# Patient Record
Sex: MALE | Race: WHITE | Employment: FULL TIME | URBAN - METROPOLITAN AREA
[De-identification: names, ages, dates, MRNs, and addresses within clinical notes are randomized per-mention and may not be internally consistent; named-entity substitution may affect disease eponyms.]

---

## 2018-05-29 ENCOUNTER — HOSPITAL ENCOUNTER (OUTPATIENT)
Dept: PHYSICAL THERAPY | Age: 38
Discharge: HOME | End: 2018-05-29
Payer: COMMERCIAL

## 2018-05-29 PROCEDURE — 97110 THERAPEUTIC EXERCISES: CPT

## 2018-05-29 PROCEDURE — 97161 PT EVAL LOW COMPLEX 20 MIN: CPT

## 2018-05-29 NOTE — PROGRESS NOTES
PT DAILY TREATMENT NOTE 12    Patient Name: Sreedhar Galvan  Date:2018  : 1980  [x]  Patient  Verified  Payor: Genna Hodges / Plan: [employer] / Product Type: HMO /    In time:900  Out time:950  Total Treatment Time (min): 50  Visit #: 1 of 12    Treatment Area: Cervicalgia [M54.2]  Radiculopathy, cervical region [M54.12]    SUBJECTIVE  Pain Level (0-10 scale): 3  Any medication changes, allergies to medications, adverse drug reactions, diagnosis change, or new procedure performed?: [x] No    [] Yes (see summary sheet for update)  Subjective functional status/changes:   [] No changes reported      OBJECTIVE    Modality rationale:    Min Type Additional Details    [] Estim:  []Unatt       []IFC  []Premod                        []Other:  []w/ice   []w/heat  Position:  Location:    [] Estim: []Att    []TENS instruct  []NMES                    []Other:  []w/US   []w/ice   []w/heat  Position:  Location:    []  Traction: [] Cervical       []Lumbar                       [] Prone          []Supine                       []Intermittent   []Continuous Lbs:  [] before manual  [] after manual    []  Ultrasound: []Continuous   [] Pulsed                           []1MHz   []3MHz W/cm2:  Location:    []  Iontophoresis with dexamethasone         Location: [] Take home patch   [] In clinic    []  Ice     []  heat  []  Ice massage  []  Laser   []  Anodyne Position:  Location:    []  Laser with stim  []  Other:  Position:  Location:    []  Vasopneumatic Device Pressure:       [] lo [] med [] hi   Temperature: [] lo [] med [] hi   [] Skin assessment post-treatment:  []intact []redness- no adverse reaction    []redness  adverse reaction:     42 min [x]Eval                  []Re-Eval       8 min Therapeutic Exercise:  [] See flow sheet : HEP   Rationale: increase ROM and increase strength to improve the patients ability to reduce radicular symptoms       With   [] TE   [] TA   [] neuro   [] other: Patient Education: [x] Review HEP    [] Progressed/Changed HEP based on:   [] positioning   [] body mechanics   [] transfers   [] heat/ice application    [] other:      Other Objective/Functional Measures:      Pain Level (0-10 scale) post treatment:3    ASSESSMENT/Changes in Function:     Patient will continue to benefit from skilled PT services to modify and progress therapeutic interventions, address functional mobility deficits, address ROM deficits, address strength deficits, analyze and address soft tissue restrictions, analyze and cue movement patterns, analyze and modify body mechanics/ergonomics, assess and modify postural abnormalities, address imbalance/dizziness and instruct in home and community integration to attain remaining goals.      [x]  See Plan of Care  []  See progress note/recertification  []  See Discharge Summary         Progress towards goals / Updated goals:  See POC    PLAN  []  Upgrade activities as tolerated     [x]  Continue plan of care  []  Update interventions per flow sheet       []  Discharge due to:_  []  Other:_      Michael Perez PT 5/29/2018  8:18 AM    Future Appointments  Date Time Provider Jennifer Guerra   5/29/2018 9:00 AM Michael Perez PT [facility]

## 2018-05-31 ENCOUNTER — HOSPITAL ENCOUNTER (OUTPATIENT)
Dept: PHYSICAL THERAPY | Age: 38
Discharge: HOME | End: 2018-05-31
Payer: COMMERCIAL

## 2018-05-31 PROCEDURE — 97014 ELECTRIC STIMULATION THERAPY: CPT

## 2018-05-31 PROCEDURE — 97112 NEUROMUSCULAR REEDUCATION: CPT

## 2018-05-31 NOTE — PROGRESS NOTES
PT DAILY TREATMENT NOTE 12    Patient Name: Vamshi Wright  Date:2018  : 1980  [x]  Patient  Verified  Payor: Gómez [employer] / Plan: [employer] / Product Type: HMO /    In time:840  Out time:931  Total Treatment Time (min): 46  Visit #: 2 of 12    Treatment Area: Cervicalgia [M54.2]  Radiculopathy, cervical region [M54.12]    SUBJECTIVE  Pain Level (0-10 scale): 3  Any medication changes, allergies to medications, adverse drug reactions, diagnosis change, or new procedure performed?: [x] No    [] Yes (see summary sheet for update)  Subjective functional status/changes:   [] No changes reported  It was super sore yesterday.      OBJECTIVE    Modality rationale: decrease inflammation and decrease pain to improve the patients ability to improve activity tolerance   Min Type Additional Details   10 [x] Estim:  [x]Unatt       []IFC  [x]Premod                        []Other:  [x]w/ice   []w/heat  Position:prone  Location:neck    [] Estim: []Att    []TENS instruct  []NMES                    []Other:  []w/US   []w/ice   []w/heat  Position:  Location:    []  Traction: [] Cervical       []Lumbar                       [] Prone          []Supine                       []Intermittent   []Continuous Lbs:  [] before manual  [] after manual    []  Ultrasound: []Continuous   [] Pulsed                           []1MHz   []3MHz W/cm2:  Location:    []  Iontophoresis with dexamethasone         Location: [] Take home patch   [] In clinic    []  Ice     []  heat  []  Ice massage  []  Laser   []  Anodyne Position:  Location:    []  Laser with stim  []  Other:  Position:  Location:    []  Vasopneumatic Device Pressure:       [] lo [] med [] hi   Temperature: [] lo [] med [] hi   [] Skin assessment post-treatment:  []intact []redness- no adverse reaction    []redness  adverse reaction:     41 min Neuromuscular Re-education:  []  See flow sheet :   Rationale: increase strength and improve coordination  to improve the patients ability to improve cervical/scapular stability    With   [] TE   [] TA   [] neuro   [] other: Patient Education: [x] Review HEP    [] Progressed/Changed HEP based on:   [] positioning   [] body mechanics   [] transfers   [] heat/ice application    [] other:      Other Objective/Functional Measures: initiated treatment per flow sheet     Pain Level (0-10 scale) post treatment: 1-2    ASSESSMENT/Changes in Function: No increase, or decrease, in Left UE numbness during first follow up. Patient reports slight decrease in severity of numbness in the Left thumb following session. Patient will continue to benefit from skilled PT services to modify and progress therapeutic interventions, address functional mobility deficits, address ROM deficits, address strength deficits, analyze and address soft tissue restrictions, analyze and cue movement patterns, analyze and modify body mechanics/ergonomics, assess and modify postural abnormalities, address imbalance/dizziness and instruct in home and community integration to attain remaining goals. []  See Plan of Care  []  See progress note/recertification  []  See Discharge Summary         Progress towards goals / Updated goals:  Short Term Goals: To be accomplished in 1 weeks:  Goal: Patient will be independent and compliant with HEP in order to progress toward long term goals. Status at last note/certification: Issued and reviewed  Current status: Progressing, initiated   Long Term Goals: To be accomplished in 12 treatments:  Goal: Patient will improve FOTO assessment score to 76 in order to indicate improved functional abilities. Status at last note/certification: 55  Current status:   Goal: Patient will report no increase in pain or numbness with full active cervical extension in order to indicate centralization of symptoms and improve ease of daily tasks.   Status at last note/certification: Increasing left thumb numbness, posterior shoulder/scapular pain  Current status:   Goal: Patient will report at least 50% reduction in radicular symptoms in order to more easily perform daily tasks with the Left UE. Status at last note/certification: n/a  Current status:   Goal: Patient will improve Left middle trapezius strength to 5/5 without pain in order to improve overall stability with recreational activities.   Status at last note/certification: 4/5 with pain  Current status:     PLAN  []  Upgrade activities as tolerated     [x]  Continue plan of care  []  Update interventions per flow sheet       []  Discharge due to:_  []  Other:_      Justyn Peterson PT 5/31/2018  8:58 AM    Future Appointments  Date Time Provider Jennifer Guerra   6/4/2018 8:30  [address] [facility]   6/5/2018 8:30 AM uJstyn Peterson PT [facility]   6/7/2018 8:30 AM Judith Buckner [facility]   6/11/2018 8:30  [address] [facility]   6/12/2018 8:30  [address] [facility]   6/14/2018 8:30  [address] [facility]

## 2018-06-04 ENCOUNTER — HOSPITAL ENCOUNTER (OUTPATIENT)
Dept: PHYSICAL THERAPY | Age: 38
Discharge: HOME | End: 2018-06-04
Payer: COMMERCIAL

## 2018-06-04 PROCEDURE — 97112 NEUROMUSCULAR REEDUCATION: CPT

## 2018-06-04 PROCEDURE — 97014 ELECTRIC STIMULATION THERAPY: CPT

## 2018-06-04 NOTE — PROGRESS NOTES
PT DAILY TREATMENT NOTE 12-    Patient Name: Andre Brantley  Date:2018  : 1980  [x]  Patient  Verified  Payor: Desiree Hannon / Plan: [employer] / Product Type: HMO /    In time: 8:25  Out time: 9:02  Total Treatment Time (min): 37  Visit #: 3 of 12    Treatment Area: Cervicalgia [M54.2]  Radiculopathy, cervical region [M54.12]    SUBJECTIVE  Pain Level (0-10 scale): 1-2/10  Any medication changes, allergies to medications, adverse drug reactions, diagnosis change, or new procedure performed?: [x] No    [] Yes (see summary sheet for update)  Subjective functional status/changes:   [] No changes reported  \"I don't know if the pain is getting better or if the medication is awesome but we are doing something. I only have numbness in the left thumb now. The other fingers aren't numb anymore. \"     OBJECTIVE    Modality rationale: decrease inflammation and decrease pain to improve the patients ability to improve activity tolerance   Min Type Additional Details   10 [x] Estim:  [x]Unatt       []IFC  [x]Premod                        []Other:  [x]w/ice   []w/heat  Position:prone  Location:neck    [] Estim: []Att    []TENS instruct  []NMES                    []Other:  []w/US   []w/ice   []w/heat  Position:  Location:    []  Traction: [] Cervical       []Lumbar                       [] Prone          []Supine                       []Intermittent   []Continuous Lbs:  [] before manual  [] after manual    []  Ultrasound: []Continuous   [] Pulsed                           []1MHz   []3MHz W/cm2:  Location:    []  Iontophoresis with dexamethasone         Location: [] Take home patch   [] In clinic    []  Ice     []  heat  []  Ice massage  []  Laser   []  Anodyne Position:  Location:    []  Laser with stim  []  Other:  Position:  Location:    []  Vasopneumatic Device Pressure:       [] lo [] med [] hi   Temperature: [] lo [] med [] hi   [] Skin assessment post-treatment:  []intact []redness- no adverse reaction    []redness  adverse reaction:     27 min Neuromuscular Re-education:  []  See flow sheet :   Rationale: increase strength and improve coordination  to improve the patients ability to improve cervical/scapular stability    With   [] TE   [] TA   [] neuro   [] other: Patient Education: [x] Review HEP    [] Progressed/Changed HEP based on:   [] positioning   [] body mechanics   [] transfers   [] heat/ice application    [] other:      Other Objective/Functional Measures: Continued with Rx program per flow sheet. Pain Level (0-10 scale) post treatment: 0/10    ASSESSMENT/Changes in Function: Pt notes no numbness in fingers except left thumb. Pt reported no clicking or cramping in neck and shoulder blade region. Left thumb numbness did not change. Patient will continue to benefit from skilled PT services to address functional mobility deficits, address ROM deficits, address strength deficits, analyze and address soft tissue restrictions, analyze and cue movement patterns, analyze and modify body mechanics/ergonomics, assess and modify postural abnormalities and instruct in home and community integration to attain remaining goals. []  See Plan of Care  []  See progress note/recertification  []  See Discharge Summary         Progress towards goals / Updated goals:  Short Term Goals: To be accomplished in 1 weeks:  Goal: Patient will be independent and compliant with HEP in order to progress toward long term goals. Status at last note/certification: Issued and reviewed  Current status: met - Pt reports compliance  Long Term Goals: To be accomplished in 12 treatments:  Goal: Patient will improve FOTO assessment score to 76 in order to indicate improved functional abilities.   Status at last note/certification: 55  Current status: reassess visit 5  Goal: Patient will report no increase in pain or numbness with full active cervical extension in order to indicate centralization of symptoms and improve ease of daily tasks. Status at last note/certification: Increasing left thumb numbness, posterior shoulder/scapular pain  Current status:   Goal: Patient will report at least 50% reduction in radicular symptoms in order to more easily perform daily tasks with the Left UE. Status at last note/certification: n/a  Current status:   Goal: Patient will improve Left middle trapezius strength to 5/5 without pain in order to improve overall stability with recreational activities.   Status at last note/certification: 4/5 with pain  Current status:     PLAN  []  Upgrade activities as tolerated     [x]  Continue plan of care  []  Update interventions per flow sheet       []  Discharge due to:_  [x]  Other:_goals next visit      Risa Tobar, PT 6/4/2018  8:58 AM    Future Appointments  Date Time Provider Jennifer Guerra   6/5/2018 8:30  [address] [facility]   6/7/2018 8:30  [address] [facility]   6/11/2018 8:30  [address] [facility]   6/12/2018 8:30  [address] [facility]   6/14/2018 8:30  [address] [facility]

## 2018-06-05 ENCOUNTER — HOSPITAL ENCOUNTER (OUTPATIENT)
Dept: PHYSICAL THERAPY | Age: 38
Discharge: HOME | End: 2018-06-05
Payer: COMMERCIAL

## 2018-06-05 PROCEDURE — 97014 ELECTRIC STIMULATION THERAPY: CPT

## 2018-06-05 PROCEDURE — 97112 NEUROMUSCULAR REEDUCATION: CPT

## 2018-06-05 NOTE — PROGRESS NOTES
PT DAILY TREATMENT NOTE 12-    Patient Name: Luis Alberto Jasso  Date:2018  : 1980  [x]  Patient  Verified  Payor: Erwin Bar / Plan: [employer] / Product Type: HMO /    In time:8:30  Out time:9;15  Total Treatment Time (min): 45  Visit #: 4 of 12    Treatment Area: Cervicalgia [M54.2]  Radiculopathy, cervical region [M54.12]    SUBJECTIVE  Pain Level (0-10 scale): 2  Any medication changes, allergies to medications, adverse drug reactions, diagnosis change, or new procedure performed?: [x] No    [] Yes (see summary sheet for update)  Subjective functional status/changes:   [] No changes reported  I feel good after PT, but it only lasts until that evening.   Overall my neck isn't bothering me, just this throbbing, pain in my shoulder    OBJECTIVE    Modality rationale: decrease pain and increase tissue extensibility to improve the patients ability to improve ease of daily tasks   Min Type Additional Details   15 [x] Estim:  [x]Unatt       []IFC  [x]Premod                        []Other:  [x]w/ice   []w/heat  Position:prone  Location: neck, left shoulder    [] Estim: []Att    []TENS instruct  []NMES                    []Other:  []w/US   []w/ice   []w/heat  Position:  Location:    []  Traction: [] Cervical       []Lumbar                       [] Prone          []Supine                       []Intermittent   []Continuous Lbs:  [] before manual  [] after manual    []  Ultrasound: []Continuous   [] Pulsed                           []1MHz   []3MHz W/cm2:  Location:    []  Iontophoresis with dexamethasone         Location: [] Take home patch   [] In clinic    []  Ice     []  heat  []  Ice massage  []  Laser   []  Anodyne Position:  Location:    []  Laser with stim  []  Other:  Position:  Location:    []  Vasopneumatic Device Pressure:       [] lo [] med [] hi   Temperature: [] lo [] med [] hi   [x] Skin assessment post-treatment:  [x]intact []redness- no adverse reaction    []redness  adverse reaction:          30 min Neuromuscular Re-education:  []  See flow sheet :   Rationale: increase strength and improve coordination  to improve the patients ability to increase shoulder stability, posture          With   [] TE   [] TA   [] neuro   [] other: Patient Education: [x] Review HEP    [] Progressed/Changed HEP based on:   [] positioning   [] body mechanics   [] transfers   [] heat/ice application    [] other:      Other Objective/Functional Measures:      Pain Level (0-10 scale) post treatment: 0    ASSESSMENT/Changes in Function: Palpable tenderness and taut left thorcic PS. Neck symptoms nearly resolved. Patient will continue to benefit from skilled PT services to modify and progress therapeutic interventions, address functional mobility deficits, address ROM deficits, address strength deficits, analyze and address soft tissue restrictions, analyze and cue movement patterns, analyze and modify body mechanics/ergonomics, assess and modify postural abnormalities, address imbalance/dizziness and instruct in home and community integration to attain remaining goals. []  See Plan of Care  []  See progress note/recertification  []  See Discharge Summary         Progress towards goals / Updated goals:  Goal: Patient will be independent and compliant with HEP in order to progress toward long term goals. Status at last note/certification: Issued and reviewed  Current status: met - Pt reports compliance  Long Term Goals: To be accomplished in 12 treatments:  Goal: Patient will improve FOTO assessment score to 76 in order to indicate improved functional abilities. Status at last note/certification: 55  Current status: reassess visit 5  Goal: Patient will report no increase in pain or numbness with full active cervical extension in order to indicate centralization of symptoms and improve ease of daily tasks.   Status at last note/certification: Increasing left thumb numbness, posterior shoulder/scapular pain  Current status:   Goal: Patient will report at least 50% reduction in radicular symptoms in order to more easily perform daily tasks with the Left UE. Status at last note/certification: n/a  Current status:   Goal: Patient will improve Left middle trapezius strength to 5/5 without pain in order to improve overall stability with recreational activities.   Status at last note/certification: 4/5 with pain  Current status:     PLAN  [x]  Upgrade activities as tolerated     []  Continue plan of care  []  Update interventions per flow sheet       []  Discharge due to:_  []  Other:_      Floy Goodpasture, PTA 6/5/2018  10:02 AM    Future Appointments  Date Time Provider Jennifer Guerra   6/7/2018 8:30  [address] [facility]   6/11/2018 8:30 AM Daril Anes [facility]   6/12/2018 8:30  [address] [facility]   6/14/2018 8:30  [address] [facility]

## 2018-06-07 ENCOUNTER — HOSPITAL ENCOUNTER (OUTPATIENT)
Dept: PHYSICAL THERAPY | Age: 38
Discharge: HOME | End: 2018-06-07
Payer: COMMERCIAL

## 2018-06-07 PROCEDURE — 97112 NEUROMUSCULAR REEDUCATION: CPT

## 2018-06-07 PROCEDURE — 97014 ELECTRIC STIMULATION THERAPY: CPT

## 2018-06-07 NOTE — PROGRESS NOTES
PT DAILY TREATMENT NOTE 12-16    Patient Name: Jake Downs  Date:2018  : 1980  [x]  Patient  Verified  Payor: Umu Gonsales / Plan: [employer] / Product Type: HMO /    In time:8:30  Out time:9:30  Total Treatment Time (min): 60  Visit #: 5 of 12    Treatment Area: Cervicalgia [M54.2]  Radiculopathy, cervical region [M54.12]    SUBJECTIVE  Pain Level (0-10 scale): 1  Any medication changes, allergies to medications, adverse drug reactions, diagnosis change, or new procedure performed?: [x] No    [] Yes (see summary sheet for update)  Subjective functional status/changes:   [] No changes reported  The pain is manageable now.   But my thumb stays numb     OBJECTIVE    Modality rationale: decrease pain to improve the patients ability to improve ease of functiohn   Min Type Additional Details   15 [x] Estim:  [x]Unatt       []IFC  [x]Premod                        []Other:  [x]w/ice   []w/heat  Position: supine  Location: left scap, left shoulder    [] Estim: []Att    []TENS instruct  []NMES                    []Other:  []w/US   []w/ice   []w/heat  Position:  Location:    []  Traction: [] Cervical       []Lumbar                       [] Prone          []Supine                       []Intermittent   []Continuous Lbs:  [] before manual  [] after manual    []  Ultrasound: []Continuous   [] Pulsed                           []1MHz   []3MHz W/cm2:  Location:    []  Iontophoresis with dexamethasone         Location: [] Take home patch   [] In clinic    []  Ice     []  heat  []  Ice massage  []  Laser   []  Anodyne Position:  Location:    []  Laser with stim  []  Other:  Position:  Location:    []  Vasopneumatic Device Pressure:       [] lo [] med [] hi   Temperature: [] lo [] med [] hi   [x] Skin assessment post-treatment:  []intact []redness- no adverse reaction    []redness  adverse reaction:        45 min Neuromuscular Re-education:  []  See flow sheet :   Rationale: increase strength and improve coordination  to improve the patients ability to improve posture, ease of job tasks, ADL's          With   [] TE   [] TA   [] neuro   [] other: Patient Education: [x] Review HEP    [] Progressed/Changed HEP based on:   [] positioning   [] body mechanics   [] transfers   [] heat/ice application    [] other:      Other Objective/Functional Measures:      Pain Level (0-10 scale) post treatment:  0    ASSESSMENT/Changes in Function: overall pain is manageable, however still unable ankit do warm up with kids for football. Pain with standing arm circles at about 90 abd. Left thumb remains numb while index finger has regained sensation    Patient will continue to benefit from skilled PT services to modify and progress therapeutic interventions, address functional mobility deficits, address ROM deficits, address strength deficits, analyze and address soft tissue restrictions, analyze and cue movement patterns, analyze and modify body mechanics/ergonomics, assess and modify postural abnormalities, address imbalance/dizziness and instruct in home and community integration to attain remaining goals. []  See Plan of Care  []  See progress note/recertification  []  See Discharge Summary         Progress towards goals / Updated goals:  Goal: Patient will be independent and compliant with HEP in order to progress toward long term goals. Status at last note/certification: Issued and reviewed  Current status: met - Pt reports compliance  Long Term Goals: To be accomplished in 12 treatments:  Goal: Patient will improve FOTO assessment score to 76 in order to indicate improved functional abilities. Status at last note/certification: 55  Current status: FOTO 58, progressing  Goal: Patient will report no increase in pain or numbness with full active cervical extension in order to indicate centralization of symptoms and improve ease of daily tasks. Not met.   Still has pain with extension, and left shoulder ROM greater than 90 abd, flexion. Status at last note/certification: Increasing left thumb numbness, posterior shoulder/scapular pain  Current status:   Goal: Patient will report at least 50% reduction in radicular symptoms in order to more easily perform daily tasks with the Left UE. Status at last note/certification: n/a  Current status:  50%  Thumb remains numb  Goal: Patient will improve Left middle trapezius strength to 5/5 without pain in order to improve overall stability with recreational activities.   Status at last note/certification: 4/5 with pain  Current status:     PLAN  [x]  Upgrade activities as tolerated     []  Continue plan of care  []  Update interventions per flow sheet       []  Discharge due to:_  []  Other:_      Ryan Angeles PTA 6/7/2018  9:05 AM    Future Appointments  Date Time Provider Jennifer Guerra   6/11/2018 8:30  [address] [facility]   6/12/2018 8:30  [address] [facility]14/2018 8:30 AM [facility] Liddie Gibbs [facility]

## 2018-06-11 ENCOUNTER — HOSPITAL ENCOUNTER (OUTPATIENT)
Dept: PHYSICAL THERAPY | Age: 38
Discharge: HOME | End: 2018-06-11
Payer: COMMERCIAL

## 2018-06-11 PROCEDURE — 97112 NEUROMUSCULAR REEDUCATION: CPT

## 2018-06-11 NOTE — PROGRESS NOTES
PT DAILY TREATMENT NOTE     Patient Name: Sreedhar Galvan  Date:2018  : 1980  [x]  Patient  Verified  Payor: Genna Hodges / Plan: [employer] / Product Type: HMO /    In time:8;30  Out time:9;00  Total Treatment Time (min): 30  Visit #: 6 of 12    Treatment Area: Cervicalgia [M54.2]  Radiculopathy, cervical region [M54.12]    SUBJECTIVE  Pain Level (0-10 scale):  1  Any medication changes, allergies to medications, adverse drug reactions, diagnosis change, or new procedure performed?: [x] No    [] Yes (see summary sheet for update)  Subjective functional status/changes:   [] No changes reported  My neck feels great, but I have a burning in my shoulder and elbow. .    OBJECTIVE     30 min Neuromuscular Re-education:  []  See flow sheet :   Rationale: increase strength and improve coordination  to improve the patients ability to improve posture, reduce symptoms          With   [] TE   [] TA   [] neuro   [] other: Patient Education: [x] Review HEP    [] Progressed/Changed HEP based on:   [] positioning   [] body mechanics   [] transfers   [] heat/ice application    [] other:      Other Objective/Functional Measures:   PD modalities to assess symptoms     Pain Level (0-10 scale) post treatment:  1    ASSESSMENT/Changes in Function: describes a popping in his scapular region. Pt states it's more burning    Patient will continue to benefit from skilled PT services to modify and progress therapeutic interventions, address functional mobility deficits, address ROM deficits, address strength deficits, analyze and address soft tissue restrictions, analyze and cue movement patterns, analyze and modify body mechanics/ergonomics, assess and modify postural abnormalities, address imbalance/dizziness and instruct in home and community integration to attain remaining goals.      []  See Plan of Care  []  See progress note/recertification  []  See Discharge Summary         Progress towards goals / Updated goals:  Goal: Patient will be independent and compliant with HEP in order to progress toward long term goals. Status at last note/certification: Issued and reviewed  Current status: met - Pt reports compliance  Long Term Goals: To be accomplished in 12 treatments:  Goal: Patient will improve FOTO assessment score to 76 in order to indicate improved functional abilities. Status at last note/certification: 55  Current status: FOTO 58, progressing  Goal: Patient will report no increase in pain or numbness with full active cervical extension in order to indicate centralization of symptoms and improve ease of daily tasks. Not met. Still has pain with extension, and left shoulder ROM greater than 90 abd, flexion. Status at last note/certification: Increasing left thumb numbness, posterior shoulder/scapular pain  Current status:   Goal: Patient will report at least 50% reduction in radicular symptoms in order to more easily perform daily tasks with the Left UE. Status at last note/certification: n/a  Current status:  50%  Thumb remains numb  Goal: Patient will improve Left middle trapezius strength to 5/5 without pain in order to improve overall stability with recreational activities.   Status at last note/certification: 4/5 with pain  Current status:     PLAN  [x]  Upgrade activities as tolerated     []  Continue plan of care  []  Update interventions per flow sheet       []  Discharge due to:_  []  Other:_      Jesus Lee PTA 6/11/2018  8:43 AM    Future Appointments  Date Time Provider Jennifer Guerra   6/12/2018 8:30  [address] [facility]   6/14/2018 8:30  [address] [facility]

## 2018-06-12 ENCOUNTER — HOSPITAL ENCOUNTER (OUTPATIENT)
Dept: PHYSICAL THERAPY | Age: 38
Discharge: HOME | End: 2018-06-12
Payer: COMMERCIAL

## 2018-06-12 PROCEDURE — 97014 ELECTRIC STIMULATION THERAPY: CPT

## 2018-06-12 PROCEDURE — 97140 MANUAL THERAPY 1/> REGIONS: CPT

## 2018-06-12 PROCEDURE — 97110 THERAPEUTIC EXERCISES: CPT

## 2018-06-12 NOTE — PROGRESS NOTES
PT DAILY TREATMENT NOTE     Patient Name: Zackery Reynaga  Date:2018  : 1980  [x]  Patient  Verified  Payor: [employer] Both / Plan: [employer] / Product Type: HMO /    In time:8;30  Out time:9;25  Total Treatment Time (min): 55  Visit #: 7 of 12    Treatment Area: Cervicalgia [M54.2]  Radiculopathy, cervical region [M54.12]    SUBJECTIVE  Pain Level (0-10 scale): 1  Any medication changes, allergies to medications, adverse drug reactions, diagnosis change, or new procedure performed?: [x] No    [] Yes (see summary sheet for update)  Subjective functional status/changes:   [] No changes reported  It's still sore in my shoulder, tricep deltoid region.       OBJECTIVE    Modality rationale: decrease pain and increase tissue extensibility to improve the patients ability to improve activity toleance   Min Type Additional Details   15 [x] Estim:  [x]Unatt       []IFC  [x]Premod                        []Other:  [x]w/ice   []w/heat  Position: prone  Location: left UT, scap retractors    [] Estim: []Att    []TENS instruct  []NMES                    []Other:  []w/US   []w/ice   []w/heat  Position:  Location:    []  Traction: [] Cervical       []Lumbar                       [] Prone          []Supine                       []Intermittent   []Continuous Lbs:  [] before manual  [] after manual    []  Ultrasound: []Continuous   [] Pulsed                           []1MHz   []3MHz W/cm2:  Location:    []  Iontophoresis with dexamethasone         Location: [] Take home patch   [] In clinic    []  Ice     []  heat  []  Ice massage  []  Laser   []  Anodyne Position:  Location:    []  Laser with stim  []  Other:  Position:  Location:    []  Vasopneumatic Device Pressure:       [] lo [] med [] hi   Temperature: [] lo [] med [] hi   [x] Skin assessment post-treatment:  [x]intact []redness- no adverse reaction    []redness  adverse reaction:       15 min Therapeutic Exercise:  [] See flow sheet : Rationale: increase ROM and increase strength to improve the patients ability to improve posture, function, OH reach    25 min Manual Therapy:  STM and Trigger poiitn release to left scap retractors, infrasspinatus   Rationale: decrease pain, increase ROM, increase tissue extensibility and decrease trigger points to reduce c/o shoulder pain, improve ease of reach         With   [] TE   [] TA   [] neuro   [] other: Patient Education: [x] Review HEP    [] Progressed/Changed HEP based on:   [] positioning   [] body mechanics   [] transfers   [] heat/ice application    [] other:      Other Objective/Functional Measures: held ex's to focus on Manual for pain, trigger points     Pain Level (0-10 scale) post treatment:  1    ASSESSMENT/Changes in Function: Good release of Trigger points with MT and pt reporting no symptoms afterwartds    Patient will continue to benefit from skilled PT services to modify and progress therapeutic interventions, address functional mobility deficits, address ROM deficits, address strength deficits, analyze and address soft tissue restrictions, analyze and cue movement patterns, analyze and modify body mechanics/ergonomics, assess and modify postural abnormalities and address imbalance/dizziness to attain remaining goals. []  See Plan of Care  []  See progress note/recertification  []  See Discharge Summary         Progress towards goals / Updated goals:  Goal: Patient will be independent and compliant with HEP in order to progress toward long term goals. Status at last note/certification: Issued and reviewed  Current status: met - Pt reports compliance  Long Term Goals: To be accomplished in 12 treatments:  Goal: Patient will improve FOTO assessment score to 76 in order to indicate improved functional abilities.   Status at last note/certification: 55  Current status: FOTO 58, progressing  Goal: Patient will report no increase in pain or numbness with full active cervical extension in order to indicate centralization of symptoms and improve ease of daily tasks. Not met. Stacie Gutierrez has pain with extension, and left shoulder ROM greater than 90 abd, flexion. Status at last note/certification: Increasing left thumb numbness, posterior shoulder/scapular pain  Current status:   Goal: Patient will report at least 50% reduction in radicular symptoms in order to more easily perform daily tasks with the Left UE. Status at last note/certification: n/a  Current status:  50%  Thumb remains numb  Goal: Patient will improve Left middle trapezius strength to 5/5 without pain in order to improve overall stability with recreational activities.   Status at last note/certification: 4/5 with pain  Current status:     PLAN  [x]  Upgrade activities as tolerated     []  Continue plan of care  []  Update interventions per flow sheet       []  Discharge due to:_  []  Other:_      Angelo Coker PTA 6/12/2018  9:20 AM    Future Appointments  Date Time Provider Jennifer Guerra   6/14/2018 8:30  [address] [facility]

## 2018-06-14 ENCOUNTER — HOSPITAL ENCOUNTER (OUTPATIENT)
Dept: PHYSICAL THERAPY | Age: 38
Discharge: HOME | End: 2018-06-14
Payer: COMMERCIAL

## 2018-06-14 PROCEDURE — 97140 MANUAL THERAPY 1/> REGIONS: CPT

## 2018-06-14 PROCEDURE — 97110 THERAPEUTIC EXERCISES: CPT

## 2018-06-14 PROCEDURE — 97014 ELECTRIC STIMULATION THERAPY: CPT

## 2018-06-14 NOTE — PROGRESS NOTES
PT DAILY TREATMENT NOTE     Patient Name: Cleveland Graft  Date:2018  : 1980  [x]  Patient  Verified  Payor: [employer] [facility] / Plan: [employer] / Product Type: HMO /    In time: 8;30  Out time:9;20  Total Treatment Time (min): 50  Visit #: 8 of 12    Treatment Area: Cervicalgia [M54.2]  Radiculopathy, cervical region [M54.12]    SUBJECTIVE  Pain Level (0-10 scale): 0  Any medication changes, allergies to medications, adverse drug reactions, diagnosis change, or new procedure performed?: [x] No    [] Yes (see summary sheet for update)  Subjective functional status/changes:   [] No changes reported  I feel great. Very slight pulling in my arm now. I don't know what you did but it worked.     OBJECTIVE    Modality rationale: decrease pain and increase tissue extensibility to improve the patients ability to improve ease of function   Min Type Additional Details   15 [x] Estim:  [x]Unatt       []IFC  [x]Premod                        []Other:  [x]w/ice   []w/heat  Position: prone  Location: Left UT, mid scap    [] Estim: []Att    []TENS instruct  []NMES                    []Other:  []w/US   []w/ice   []w/heat  Position:  Location:    []  Traction: [] Cervical       []Lumbar                       [] Prone          []Supine                       []Intermittent   []Continuous Lbs:  [] before manual  [] after manual    []  Ultrasound: []Continuous   [] Pulsed                           []1MHz   []3MHz W/cm2:  Location:    []  Iontophoresis with dexamethasone         Location: [] Take home patch   [] In clinic    []  Ice     []  heat  []  Ice massage  []  Laser   []  Anodyne Position:  Location:    []  Laser with stim  []  Other:  Position:  Location:    []  Vasopneumatic Device Pressure:       [] lo [] med [] hi   Temperature: [] lo [] med [] hi   [x] Skin assessment post-treatment:  [x]intact []redness- no adverse reaction    []redness  adverse reaction:       20 min Therapeutic Exercise: [] See flow sheet :   Rationale: increase ROM and increase strength to improve the patients ability to improve ease of function, reach, job tasks    15 min Manual Therapy:  Trigger point release to left UT, STM to mid scap and UT. Scap mobs in prone   Rationale: decrease pain, increase ROM, increase tissue extensibility and decrease trigger points to improve ease of reach, ADL's,           With   [] TE   [] TA   [] neuro   [] other: Patient Education: [x] Review HEP    [] Progressed/Changed HEP based on:   [] positioning   [] body mechanics   [] transfers   [] heat/ice application    [] other:      Other Objective/Functional Measures:      Pain Level (0-10 scale) post treatment: 0    ASSESSMENT/Changes in Function: N/T in fingers resolved with exception of thumb. Decreed trigger points in Left UT, mid scap and only mild reports of pulling with UE reach. Full Left UE ROM. Patient will continue to benefit from skilled PT services to modify and progress therapeutic interventions, address functional mobility deficits, address ROM deficits, address strength deficits, analyze and address soft tissue restrictions, analyze and cue movement patterns, analyze and modify body mechanics/ergonomics, assess and modify postural abnormalities and address imbalance/dizziness to attain remaining goals. []  See Plan of Care  []  See progress note/recertification  []  See Discharge Summary         Progress towards goals / Updated goals:  Goal: Patient will be independent and compliant with HEP in order to progress toward long term goals. Status at last note/certification: Issued and reviewed  Current status: met - Pt reports compliance  Long Term Goals: To be accomplished in 12 treatments:  Goal: Patient will improve FOTO assessment score to 76 in order to indicate improved functional abilities.   Status at last note/certification: 55  Current status: FOTO 58, progressing  Goal: Patient will report no increase in pain or numbness with full active cervical extension in order to indicate centralization of symptoms and improve ease of daily tasks. Current Status: Progressing: no pain with cs extension, very slight pulling with left UE abd, none with flexion    Status at last note/certification: Increasing left thumb numbness, posterior shoulder/scapular pain  Goal: Patient will report at least 50% reduction in radicular symptoms in order to more easily perform daily tasks with the Left UE. Status at last note/certification: n/a  Current status:  50%  Thumb remains numb, no longer having N/T in other fingers  Goal: Patient will improve Left middle trapezius strength to 5/5 without pain in order to improve overall stability with recreational activities. Status at last note/certification: 4/5 with pain  Current status: 4+/5  Met     PLAN  [x]  Upgrade activities as tolerated     []  Continue plan of care  []  Update interventions per flow sheet       []  Discharge due to:_  []  Other:_      Tiki Cruz, JAMEE 6/14/2018  9:12 AM    No future appointments.

## 2018-07-09 NOTE — PROGRESS NOTES
[facility] [address] [place] [address]  [phone] fax    Discharge Summary    Patient name: Eileen Faustin Start of Care: 2018   Referral source: Kendra Paulino.* : 1980                          Medical Diagnosis: Cervicalgia [M54.2]  Radiculopathy, cervical region [M54.12] Onset Date:2 weeks                          Treatment Diagnosis: neck, Left arm pain   Prior Hospitalization: see medical history Provider#: [id number]   Medications: Verified on Patient summary List    Comorbidities: arthritis, allergies, back pain   Prior Level of Function: Functionally independent, lives with family, manager at [employer], coaches youth football    Visits from [place] of Care: 8    Missed Visits: 0    Reporting Period : 18 to 18    Goal: Patient will be independent and compliant with HEP in order to progress toward long term goals. Status at last note/certification: Issued and reviewed  Current status: Met - Pt reports compliance  Long Term Goals: To be accomplished in 12 treatments:  Goal: Patient will improve FOTO assessment score to 76 in order to indicate improved functional abilities. Status at last note/certification: 55  Current status: Progressing, FOTO 58  Goal: Patient will report no increase in pain or numbness with full active cervical extension in order to indicate centralization of symptoms and improve ease of daily tasks. Current Status: Progressing: no pain with cs extension, very slight pulling with left UE abd, none with flexion    Status at last note/certification: Increasing left thumb numbness, posterior shoulder/scapular pain  Goal: Patient will report at least 50% reduction in radicular symptoms in order to more easily perform daily tasks with the Left UE. Status at last note/certification: n/a  Current status: Met, 50%;  Thumb remains numb, no longer having N/T in other fingers  Goal: Patient will improve Left middle trapezius strength to 5/5 without pain in order to improve overall stability with recreational activities. Status at last note/certification: 4/5 with pain  Current status: Met, 4+/5        Assessment/ Summary of Care: Patient attended therapy consistently for neck and Left arm pain, reporting no pain at most recent session and demonstrating improved strength with less radicular symptoms. Patient was contacted after most recent visit to schedule more appointments, but due to not returning multiple calls he will be discharged at this time. Current status unknown. Thank you for the referral of this Patient.      RECOMMENDATIONS:  [x]Discontinue therapy: [x]Patient has reached or is progressing toward set goals      []Patient is non-compliant or has abdicated      []Due to lack of appreciable progress towards set [facility],  7/9/2018 12:54 PM